# Patient Record
Sex: FEMALE | Race: WHITE | HISPANIC OR LATINO | Employment: UNEMPLOYED | URBAN - METROPOLITAN AREA
[De-identification: names, ages, dates, MRNs, and addresses within clinical notes are randomized per-mention and may not be internally consistent; named-entity substitution may affect disease eponyms.]

---

## 2023-01-17 ENCOUNTER — APPOINTMENT (OUTPATIENT)
Dept: NUCLEAR MEDICINE | Facility: HOSPITAL | Age: 58
End: 2023-01-17
Payer: MEDICAID

## 2023-01-17 ENCOUNTER — APPOINTMENT (OUTPATIENT)
Dept: GENERAL RADIOLOGY | Facility: HOSPITAL | Age: 58
End: 2023-01-17
Payer: MEDICAID

## 2023-01-17 ENCOUNTER — HOSPITAL ENCOUNTER (OUTPATIENT)
Facility: HOSPITAL | Age: 58
Discharge: HOME | End: 2023-01-17
Attending: EMERGENCY MEDICINE | Admitting: INTERNAL MEDICINE
Payer: MEDICAID

## 2023-01-17 ENCOUNTER — READMISSION MANAGEMENT (OUTPATIENT)
Dept: CALL CENTER | Facility: HOSPITAL | Age: 58
End: 2023-01-17
Payer: MEDICAID

## 2023-01-17 ENCOUNTER — APPOINTMENT (OUTPATIENT)
Dept: CARDIOLOGY | Facility: HOSPITAL | Age: 58
End: 2023-01-17
Payer: MEDICAID

## 2023-01-17 VITALS
DIASTOLIC BLOOD PRESSURE: 71 MMHG | HEIGHT: 59 IN | TEMPERATURE: 98 F | HEART RATE: 75 BPM | RESPIRATION RATE: 18 BRPM | SYSTOLIC BLOOD PRESSURE: 114 MMHG | OXYGEN SATURATION: 99 % | BODY MASS INDEX: 22.58 KG/M2 | WEIGHT: 112 LBS

## 2023-01-17 DIAGNOSIS — R07.9 CHEST PAIN, UNSPECIFIED TYPE: Primary | ICD-10-CM

## 2023-01-17 LAB
ALBUMIN SERPL-MCNC: 4.3 G/DL (ref 3.5–5.2)
ALBUMIN/GLOB SERPL: 1.9 G/DL
ALP SERPL-CCNC: 96 U/L (ref 39–117)
ALT SERPL W P-5'-P-CCNC: 11 U/L (ref 1–33)
ANION GAP SERPL CALCULATED.3IONS-SCNC: 8 MMOL/L (ref 5–15)
ANION GAP SERPL CALCULATED.3IONS-SCNC: 9 MMOL/L (ref 5–15)
AST SERPL-CCNC: 20 U/L (ref 1–32)
BASOPHILS # BLD AUTO: 0 10*3/MM3 (ref 0–0.2)
BASOPHILS # BLD AUTO: 0 10*3/MM3 (ref 0–0.2)
BASOPHILS NFR BLD AUTO: 0.4 % (ref 0–1.5)
BASOPHILS NFR BLD AUTO: 0.6 % (ref 0–1.5)
BH CV NUCLEAR PRIOR STUDY: 3
BH CV REST NUCLEAR ISOTOPE DOSE: 8 MCI
BH CV STRESS BP STAGE 1: NORMAL
BH CV STRESS BP STAGE 2: NORMAL
BH CV STRESS BP STAGE 3: NORMAL
BH CV STRESS BP STAGE 4: NORMAL
BH CV STRESS COMMENTS STAGE 1: NORMAL
BH CV STRESS DOSE REGADENOSON STAGE 1: 0.4
BH CV STRESS DURATION MIN STAGE 1: 1
BH CV STRESS DURATION MIN STAGE 2: 1
BH CV STRESS DURATION MIN STAGE 3: 1
BH CV STRESS DURATION MIN STAGE 4: 1
BH CV STRESS DURATION SEC STAGE 2: 0
BH CV STRESS HR STAGE 1: 120
BH CV STRESS HR STAGE 2: 117
BH CV STRESS HR STAGE 3: 113
BH CV STRESS HR STAGE 4: 111
BH CV STRESS NUCLEAR ISOTOPE DOSE: 31.4 MCI
BH CV STRESS PROTOCOL 1: NORMAL
BH CV STRESS RECOVERY BP: NORMAL MMHG
BH CV STRESS RECOVERY HR: 104 BPM
BH CV STRESS STAGE 1: 1
BH CV STRESS STAGE 2: 2
BH CV STRESS STAGE 3: 3
BH CV STRESS STAGE 4: 4
BILIRUB SERPL-MCNC: <0.2 MG/DL (ref 0–1.2)
BUN SERPL-MCNC: 19 MG/DL (ref 6–20)
BUN SERPL-MCNC: 20 MG/DL (ref 6–20)
BUN/CREAT SERPL: 44.2 (ref 7–25)
BUN/CREAT SERPL: 44.4 (ref 7–25)
CALCIUM SPEC-SCNC: 8.6 MG/DL (ref 8.6–10.5)
CALCIUM SPEC-SCNC: 9.3 MG/DL (ref 8.6–10.5)
CHLORIDE SERPL-SCNC: 104 MMOL/L (ref 98–107)
CHLORIDE SERPL-SCNC: 107 MMOL/L (ref 98–107)
CHOLEST SERPL-MCNC: 182 MG/DL (ref 0–200)
CO2 SERPL-SCNC: 27 MMOL/L (ref 22–29)
CO2 SERPL-SCNC: 27 MMOL/L (ref 22–29)
CREAT SERPL-MCNC: 0.43 MG/DL (ref 0.57–1)
CREAT SERPL-MCNC: 0.45 MG/DL (ref 0.57–1)
D DIMER PPP FEU-MCNC: <0.19 MG/L (FEU) (ref 0–0.58)
DEPRECATED RDW RBC AUTO: 42.4 FL (ref 37–54)
DEPRECATED RDW RBC AUTO: 43.3 FL (ref 37–54)
EGFRCR SERPLBLD CKD-EPI 2021: 111.7 ML/MIN/1.73
EGFRCR SERPLBLD CKD-EPI 2021: 112.9 ML/MIN/1.73
EOSINOPHIL # BLD AUTO: 0.1 10*3/MM3 (ref 0–0.4)
EOSINOPHIL # BLD AUTO: 0.2 10*3/MM3 (ref 0–0.4)
EOSINOPHIL NFR BLD AUTO: 1.4 % (ref 0.3–6.2)
EOSINOPHIL NFR BLD AUTO: 4.3 % (ref 0.3–6.2)
ERYTHROCYTE [DISTWIDTH] IN BLOOD BY AUTOMATED COUNT: 12.6 % (ref 12.3–15.4)
ERYTHROCYTE [DISTWIDTH] IN BLOOD BY AUTOMATED COUNT: 12.6 % (ref 12.3–15.4)
GLOBULIN UR ELPH-MCNC: 2.3 GM/DL
GLUCOSE SERPL-MCNC: 104 MG/DL (ref 65–99)
GLUCOSE SERPL-MCNC: 110 MG/DL (ref 65–99)
HCT VFR BLD AUTO: 35 % (ref 34–46.6)
HCT VFR BLD AUTO: 38.9 % (ref 34–46.6)
HDLC SERPL-MCNC: 67 MG/DL (ref 40–60)
HGB BLD-MCNC: 11.8 G/DL (ref 12–15.9)
HGB BLD-MCNC: 13.3 G/DL (ref 12–15.9)
LDLC SERPL CALC-MCNC: 100 MG/DL (ref 0–100)
LDLC/HDLC SERPL: 1.47 {RATIO}
LV EF NUC BP: 88 %
LYMPHOCYTES # BLD AUTO: 1.8 10*3/MM3 (ref 0.7–3.1)
LYMPHOCYTES # BLD AUTO: 2.5 10*3/MM3 (ref 0.7–3.1)
LYMPHOCYTES NFR BLD AUTO: 17.8 % (ref 19.6–45.3)
LYMPHOCYTES NFR BLD AUTO: 45.8 % (ref 19.6–45.3)
MAGNESIUM SERPL-MCNC: 2 MG/DL (ref 1.6–2.6)
MAXIMAL PREDICTED HEART RATE: 162 BPM
MCH RBC QN AUTO: 31.1 PG (ref 26.6–33)
MCH RBC QN AUTO: 31.6 PG (ref 26.6–33)
MCHC RBC AUTO-ENTMCNC: 33.6 G/DL (ref 31.5–35.7)
MCHC RBC AUTO-ENTMCNC: 34.4 G/DL (ref 31.5–35.7)
MCV RBC AUTO: 92.1 FL (ref 79–97)
MCV RBC AUTO: 92.6 FL (ref 79–97)
MONOCYTES # BLD AUTO: 0.5 10*3/MM3 (ref 0.1–0.9)
MONOCYTES # BLD AUTO: 0.5 10*3/MM3 (ref 0.1–0.9)
MONOCYTES NFR BLD AUTO: 5.3 % (ref 5–12)
MONOCYTES NFR BLD AUTO: 9.9 % (ref 5–12)
NEUTROPHILS NFR BLD AUTO: 2.2 10*3/MM3 (ref 1.7–7)
NEUTROPHILS NFR BLD AUTO: 39.4 % (ref 42.7–76)
NEUTROPHILS NFR BLD AUTO: 7.6 10*3/MM3 (ref 1.7–7)
NEUTROPHILS NFR BLD AUTO: 75.1 % (ref 42.7–76)
NRBC BLD AUTO-RTO: 0 /100 WBC (ref 0–0.2)
NRBC BLD AUTO-RTO: 0.1 /100 WBC (ref 0–0.2)
NT-PROBNP SERPL-MCNC: 206.4 PG/ML (ref 0–900)
PERCENT MAX PREDICTED HR: 74.69 %
PLATELET # BLD AUTO: 273 10*3/MM3 (ref 140–450)
PLATELET # BLD AUTO: 292 10*3/MM3 (ref 140–450)
PMV BLD AUTO: 7.5 FL (ref 6–12)
PMV BLD AUTO: 7.6 FL (ref 6–12)
POTASSIUM SERPL-SCNC: 3.9 MMOL/L (ref 3.5–5.2)
POTASSIUM SERPL-SCNC: 3.9 MMOL/L (ref 3.5–5.2)
PROT SERPL-MCNC: 6.6 G/DL (ref 6–8.5)
QT INTERVAL: 377 MS
RBC # BLD AUTO: 3.78 10*6/MM3 (ref 3.77–5.28)
RBC # BLD AUTO: 4.22 10*6/MM3 (ref 3.77–5.28)
SODIUM SERPL-SCNC: 140 MMOL/L (ref 136–145)
SODIUM SERPL-SCNC: 142 MMOL/L (ref 136–145)
STRESS BASELINE BP: NORMAL MMHG
STRESS BASELINE HR: 85 BPM
STRESS PERCENT HR: 88 %
STRESS POST PEAK BP: NORMAL MMHG
STRESS POST PEAK HR: 121 BPM
STRESS TARGET HR: 138 BPM
TRIGL SERPL-MCNC: 81 MG/DL (ref 0–150)
TROPONIN T SERPL-MCNC: <0.01 NG/ML (ref 0–0.03)
TSH SERPL DL<=0.05 MIU/L-ACNC: 1.3 UIU/ML (ref 0.27–4.2)
VLDLC SERPL-MCNC: 15 MG/DL (ref 5–40)
WBC NRBC COR # BLD: 10.2 10*3/MM3 (ref 3.4–10.8)
WBC NRBC COR # BLD: 5.5 10*3/MM3 (ref 3.4–10.8)

## 2023-01-17 PROCEDURE — 80061 LIPID PANEL: CPT | Performed by: NURSE PRACTITIONER

## 2023-01-17 PROCEDURE — 93005 ELECTROCARDIOGRAM TRACING: CPT | Performed by: EMERGENCY MEDICINE

## 2023-01-17 PROCEDURE — G0378 HOSPITAL OBSERVATION PER HR: HCPCS

## 2023-01-17 PROCEDURE — 93018 CV STRESS TEST I&R ONLY: CPT | Performed by: INTERNAL MEDICINE

## 2023-01-17 PROCEDURE — 78452 HT MUSCLE IMAGE SPECT MULT: CPT

## 2023-01-17 PROCEDURE — 84484 ASSAY OF TROPONIN QUANT: CPT | Performed by: NURSE PRACTITIONER

## 2023-01-17 PROCEDURE — 80050 GENERAL HEALTH PANEL: CPT | Performed by: NURSE PRACTITIONER

## 2023-01-17 PROCEDURE — 80048 BASIC METABOLIC PNL TOTAL CA: CPT | Performed by: NURSE PRACTITIONER

## 2023-01-17 PROCEDURE — 71045 X-RAY EXAM CHEST 1 VIEW: CPT

## 2023-01-17 PROCEDURE — 93306 TTE W/DOPPLER COMPLETE: CPT

## 2023-01-17 PROCEDURE — 93005 ELECTROCARDIOGRAM TRACING: CPT

## 2023-01-17 PROCEDURE — 83880 ASSAY OF NATRIURETIC PEPTIDE: CPT | Performed by: NURSE PRACTITIONER

## 2023-01-17 PROCEDURE — 25010000002 REGADENOSON 0.4 MG/5ML SOLUTION: Performed by: INTERNAL MEDICINE

## 2023-01-17 PROCEDURE — 96374 THER/PROPH/DIAG INJ IV PUSH: CPT

## 2023-01-17 PROCEDURE — 0 TECHNETIUM TETROFOSMIN KIT: Performed by: INTERNAL MEDICINE

## 2023-01-17 PROCEDURE — 93017 CV STRESS TEST TRACING ONLY: CPT

## 2023-01-17 PROCEDURE — 78452 HT MUSCLE IMAGE SPECT MULT: CPT | Performed by: INTERNAL MEDICINE

## 2023-01-17 PROCEDURE — 83735 ASSAY OF MAGNESIUM: CPT | Performed by: NURSE PRACTITIONER

## 2023-01-17 PROCEDURE — A9502 TC99M TETROFOSMIN: HCPCS | Performed by: INTERNAL MEDICINE

## 2023-01-17 PROCEDURE — 93005 ELECTROCARDIOGRAM TRACING: CPT | Performed by: NURSE PRACTITIONER

## 2023-01-17 PROCEDURE — 99285 EMERGENCY DEPT VISIT HI MDM: CPT

## 2023-01-17 PROCEDURE — 85025 COMPLETE CBC W/AUTO DIFF WBC: CPT | Performed by: NURSE PRACTITIONER

## 2023-01-17 PROCEDURE — 36415 COLL VENOUS BLD VENIPUNCTURE: CPT

## 2023-01-17 PROCEDURE — 85379 FIBRIN DEGRADATION QUANT: CPT | Performed by: NURSE PRACTITIONER

## 2023-01-17 PROCEDURE — 99232 SBSQ HOSP IP/OBS MODERATE 35: CPT | Performed by: INTERNAL MEDICINE

## 2023-01-17 RX ORDER — ONDANSETRON 4 MG/1
4 TABLET, FILM COATED ORAL EVERY 6 HOURS PRN
Status: DISCONTINUED | OUTPATIENT
Start: 2023-01-17 | End: 2023-01-17 | Stop reason: HOSPADM

## 2023-01-17 RX ORDER — SODIUM CHLORIDE 0.9 % (FLUSH) 0.9 %
10 SYRINGE (ML) INJECTION EVERY 12 HOURS SCHEDULED
Status: DISCONTINUED | OUTPATIENT
Start: 2023-01-17 | End: 2023-01-17 | Stop reason: HOSPADM

## 2023-01-17 RX ORDER — SODIUM CHLORIDE 0.9 % (FLUSH) 0.9 %
10 SYRINGE (ML) INJECTION AS NEEDED
Status: DISCONTINUED | OUTPATIENT
Start: 2023-01-17 | End: 2023-01-17 | Stop reason: HOSPADM

## 2023-01-17 RX ORDER — NITROGLYCERIN 0.4 MG/1
0.4 TABLET SUBLINGUAL
Status: COMPLETED | OUTPATIENT
Start: 2023-01-17 | End: 2023-01-17

## 2023-01-17 RX ORDER — ISOSORBIDE MONONITRATE 30 MG/1
30 TABLET, EXTENDED RELEASE ORAL
Status: DISCONTINUED | OUTPATIENT
Start: 2023-01-17 | End: 2023-01-17 | Stop reason: HOSPADM

## 2023-01-17 RX ORDER — ONDANSETRON 2 MG/ML
4 INJECTION INTRAMUSCULAR; INTRAVENOUS EVERY 6 HOURS PRN
Status: DISCONTINUED | OUTPATIENT
Start: 2023-01-17 | End: 2023-01-17 | Stop reason: HOSPADM

## 2023-01-17 RX ORDER — ALUMINA, MAGNESIA, AND SIMETHICONE 2400; 2400; 240 MG/30ML; MG/30ML; MG/30ML
15 SUSPENSION ORAL EVERY 6 HOURS PRN
Status: DISCONTINUED | OUTPATIENT
Start: 2023-01-17 | End: 2023-01-17 | Stop reason: HOSPADM

## 2023-01-17 RX ORDER — ASPIRIN 81 MG/1
324 TABLET, CHEWABLE ORAL ONCE
Status: COMPLETED | OUTPATIENT
Start: 2023-01-17 | End: 2023-01-17

## 2023-01-17 RX ORDER — ACETAMINOPHEN 160 MG/5ML
650 SOLUTION ORAL EVERY 4 HOURS PRN
Status: DISCONTINUED | OUTPATIENT
Start: 2023-01-17 | End: 2023-01-17 | Stop reason: HOSPADM

## 2023-01-17 RX ORDER — METOPROLOL SUCCINATE 25 MG/1
25 TABLET, EXTENDED RELEASE ORAL
Status: DISCONTINUED | OUTPATIENT
Start: 2023-01-17 | End: 2023-01-17 | Stop reason: HOSPADM

## 2023-01-17 RX ORDER — ALBUTEROL SULFATE 90 UG/1
2 AEROSOL, METERED RESPIRATORY (INHALATION) EVERY 4 HOURS PRN
COMMUNITY

## 2023-01-17 RX ORDER — ACETAMINOPHEN 325 MG/1
650 TABLET ORAL EVERY 4 HOURS PRN
Status: DISCONTINUED | OUTPATIENT
Start: 2023-01-17 | End: 2023-01-17 | Stop reason: HOSPADM

## 2023-01-17 RX ORDER — ASPIRIN 81 MG/1
81 TABLET, CHEWABLE ORAL DAILY
Status: DISCONTINUED | OUTPATIENT
Start: 2023-01-18 | End: 2023-01-17 | Stop reason: HOSPADM

## 2023-01-17 RX ORDER — ACETAMINOPHEN 650 MG/1
650 SUPPOSITORY RECTAL EVERY 4 HOURS PRN
Status: DISCONTINUED | OUTPATIENT
Start: 2023-01-17 | End: 2023-01-17 | Stop reason: HOSPADM

## 2023-01-17 RX ORDER — METOPROLOL SUCCINATE 25 MG/1
25 TABLET, EXTENDED RELEASE ORAL
Qty: 30 TABLET | Refills: 0 | Status: SHIPPED | OUTPATIENT
Start: 2023-01-17 | End: 2023-02-09 | Stop reason: SDUPTHER

## 2023-01-17 RX ORDER — ASPIRIN 81 MG/1
81 TABLET, CHEWABLE ORAL DAILY
Qty: 30 TABLET | Refills: 0 | Status: SHIPPED | OUTPATIENT
Start: 2023-01-18 | End: 2023-02-09 | Stop reason: SDUPTHER

## 2023-01-17 RX ORDER — SODIUM CHLORIDE 9 MG/ML
40 INJECTION, SOLUTION INTRAVENOUS AS NEEDED
Status: DISCONTINUED | OUTPATIENT
Start: 2023-01-17 | End: 2023-01-17 | Stop reason: HOSPADM

## 2023-01-17 RX ORDER — PANTOPRAZOLE SODIUM 40 MG/10ML
40 INJECTION, POWDER, LYOPHILIZED, FOR SOLUTION INTRAVENOUS
Status: DISCONTINUED | OUTPATIENT
Start: 2023-01-17 | End: 2023-01-17 | Stop reason: HOSPADM

## 2023-01-17 RX ORDER — CHOLECALCIFEROL (VITAMIN D3) 125 MCG
5 CAPSULE ORAL NIGHTLY PRN
Status: DISCONTINUED | OUTPATIENT
Start: 2023-01-17 | End: 2023-01-17 | Stop reason: HOSPADM

## 2023-01-17 RX ORDER — PANTOPRAZOLE SODIUM 40 MG/1
40 TABLET, DELAYED RELEASE ORAL DAILY
Qty: 30 TABLET | Refills: 0 | Status: SHIPPED | OUTPATIENT
Start: 2023-01-17 | End: 2023-02-09 | Stop reason: SDUPTHER

## 2023-01-17 RX ADMIN — NITROGLYCERIN 1 INCH: 20 OINTMENT TOPICAL at 02:49

## 2023-01-17 RX ADMIN — Medication 10 ML: at 11:22

## 2023-01-17 RX ADMIN — METOPROLOL SUCCINATE 25 MG: 25 TABLET, EXTENDED RELEASE ORAL at 16:42

## 2023-01-17 RX ADMIN — ACETAMINOPHEN 650 MG: 325 TABLET, FILM COATED ORAL at 16:42

## 2023-01-17 RX ADMIN — TETROFOSMIN 1 DOSE: 1.38 INJECTION, POWDER, LYOPHILIZED, FOR SOLUTION INTRAVENOUS at 14:09

## 2023-01-17 RX ADMIN — REGADENOSON 0.4 MG: 0.08 INJECTION, SOLUTION INTRAVENOUS at 14:09

## 2023-01-17 RX ADMIN — PANTOPRAZOLE SODIUM 40 MG: 40 INJECTION, POWDER, FOR SOLUTION INTRAVENOUS at 06:31

## 2023-01-17 RX ADMIN — ASPIRIN 81 MG CHEWABLE TABLET 324 MG: 81 TABLET CHEWABLE at 01:46

## 2023-01-17 RX ADMIN — NITROGLYCERIN 0.4 MG: 0.4 TABLET SUBLINGUAL at 01:48

## 2023-01-17 RX ADMIN — NITROGLYCERIN 0.4 MG: 0.4 TABLET SUBLINGUAL at 01:54

## 2023-01-17 RX ADMIN — TETROFOSMIN 1 DOSE: 1.38 INJECTION, POWDER, LYOPHILIZED, FOR SOLUTION INTRAVENOUS at 13:07

## 2023-01-17 RX ADMIN — NITROGLYCERIN 0.4 MG: 0.4 TABLET SUBLINGUAL at 02:00

## 2023-01-17 RX ADMIN — ISOSORBIDE MONONITRATE 30 MG: 30 TABLET, EXTENDED RELEASE ORAL at 16:42

## 2023-01-17 NOTE — PLAN OF CARE
Goal Outcome Evaluation:  Patient arrived to to the floor from the ED. No complaints of chest pain. Interpretor utilized. Stress Test/Echo scheduled. Will continue to monitor

## 2023-01-17 NOTE — H&P
"    [facility] Medicine Services  History & Physical    Patient Name: Bree Mcmanus  : 1965  MRN: [id number]  Primary Care Physician:  Provider, No Known  Date of admission: 2023  Date and Time of Service: 2023 at 0300    Subjective      Chief Complaint: chest pain, shortness of breath    HPI collected via hospital provided video translation   History of Present Illness: Bree Mcmanus is a 57 y.o. female Thai-speaking only with only past medical history of asthma who entered the [place] from [place] three months ago.  She presented to [facility] ED 2023 after she awoke from sleep in the middle of the night tonight feeling like she was going to vomit. She then started having left sided chest pressure and felt short of breath.  She rated her pain 8 out of 10. She had associated palpitations.  She denied any dizziness or diaphoresis.  She denied any previous cardiac history. Her mother had a \"heart problem\" but this was not clarified further. She is not on any home medications. She denied any history of smoking, alcohol use, or drug use.      In the ED patient had EKG that showed sinus rhythm rate of 94 with borderline ST depression in anterolateral leads, no previous to compare.  First troponin was normal.  CXR showed patchy interstitial airspace disease in the right lower lobe could represent infectious bronchiolitis or viral pneumonia.  Vital signs within normal limits.  Patient was given 324 mg of chewable aspirin as well as nitro sublingual x3 tablets and Nitropaste.  Her pain improved to 1 out of 10.  She was admitted for further work-up of chest pain with abnormal EKG rule out ACS.     Review of Systems   Constitutional: Negative.   HENT: Negative.    Eyes: Negative.    Cardiovascular: Positive for chest pain and palpitations.   Respiratory: Positive for shortness of breath.    Endocrine: Negative.    Hematologic/Lymphatic: Negative.    Skin: Negative.  " "  Musculoskeletal: Negative.    Gastrointestinal: Positive for nausea.   Genitourinary: Negative.    Neurological: Negative.    Psychiatric/Behavioral: Negative.    Allergic/Immunologic: Negative.    All other systems reviewed and are negative.       Personal History     History reviewed. No pertinent past medical history.    No past surgical history on file.    Family History: mother with a \"heart problem\"    Social History:  Lives with family.  Recently relocated to [place] from [place].    Home Medications:  Prior to Admission Medications     None            Allergies:  No Known Allergies    Objective      Vitals:   Temp:  [98.5 °F (36.9 °C)] 98.5 °F (36.9 °C)  Heart Rate:  [] 89  Resp:  [18] 18  BP: (104-138)/(67-87) 104/68    Physical Exam  Vitals and nursing note reviewed.   HENT:      Head: Normocephalic and atraumatic.   Eyes:      Extraocular Movements: Extraocular movements intact.      Pupils: Pupils are equal, round, and reactive to light.   Cardiovascular:      Rate and Rhythm: Normal rate and regular rhythm.      Pulses: Normal pulses.      Heart sounds: Normal heart sounds.   Pulmonary:      Effort: Pulmonary effort is normal.      Breath sounds: Normal breath sounds.   Abdominal:      General: Bowel sounds are normal.      Palpations: Abdomen is soft.      Tenderness: There is no abdominal tenderness.   Musculoskeletal:         General: Normal range of motion.   Skin:     General: Skin is warm and dry.   Neurological:      Mental Status: She is alert and oriented to person, place, and time.   Psychiatric:         Mood and Affect: Mood normal.         Behavior: Behavior normal.         Result Review    Result Review:  I have personally reviewed the results from the time of this admission to 1/17/2023 03:41 EST and agree with these findings:  [x]  Laboratory  []  Microbiology  [x]  Radiology  [x]  EKG/Telemetry   []  Cardiology/Vascular   []  Pathology  []  Old records      Assessment & Plan  "       Active Hospital Problems:  Active Hospital Problems    Diagnosis    • **Chest pain      Plan:     Chest pain  -EKG: sinus rhythm rate of 94 with borderline ST depression in anterolateral leads, no previous to compare.  -CXR: patchy interstitial airspace disease in the right lower lobe could represent infectious bronchiolitis or viral pneumonia.    -WBC normal  -first troponin normal, proBNP normal  -pain improved to 1/10 after aspirin 324mg x1, nitroglycerin sublingual tablets x3 followed by nitro paste  -repeat troponin and EKG in 2 hours rule out ACS  -check 2D echo, TSH, lipid panel  -continue cardiac monitoring   -consult cardiology for further recommendations      DVT prophylaxis: SCDs    CODE STATUS:       Admission Status:  I believe this patient meets observation status.    I discussed the patient's findings and my recommendations with patient, family and nursing staff.    This patient has been examined wearing appropriate Personal Protective Equipment. 01/17/23      Signature: Electronically signed by ESTRELLA Kuhn, 01/17/23, 03:41 EST.

## 2023-01-17 NOTE — Clinical Note
Level of Care: Telemetry [5]   Admitting Physician: ARETHA HAYES [[id number]]   Attending Physician: ARETHA HAYES [[id number]]

## 2023-01-17 NOTE — CONSULTS
Cardiology [place]        Subjective:     Encounter Date:01/17/2023      Patient ID: Bree Mcmanus is a 57 y.o. female.    Chief Complaint: chest pain    Referring Physician: Dominique Garcia    HPI:  used. Patient Faroese speaking only  Bree Mcmanus is a 57 y.o. female who presents with chest pain. Ms. Mcmanus does not routinely see a cardiologist. She recently moved to [place] from [place] 3 months ago.  No past medical history. She presented to ER last night with chest pain. According to patient she woke up from her sleep choking and had burning sensation in her upper chest. She thought it was reflux but got scared and came to ER. Upon further questioning, she reports chest pressure when she climbs stairs or lifts items. She carries no diagnosis of hypertension, HLD, DM, smoking or family history of CAD.      Work up in ER reveal negative troponin, proBNP 206, BMP unremarkable, Ddimer negative,   WBC 10.2, CXR shows patchy interstitial airspace disease in the right lower lobe could represent infectious bronchiolitis or viral pneumonia.      ECG sinus rhythm with initial ECG showing borderline anterolateral ST depression but repeat ECG showed resolution of changes.  She was pain free on my encounter.    History reviewed. No pertinent past medical history.    History reviewed. No pertinent surgical history.    History reviewed. No pertinent family history.    Social History     Socioeconomic History   • Marital status: Single   Tobacco Use   • Smoking status: Never     Passive exposure: Never   • Smokeless tobacco: Never   Substance and Sexual Activity   • Alcohol use: Never   • Drug use: Never         No Known Allergies    Current Medications:   Scheduled Meds:[START ON 1/18/2023] aspirin, 81 mg, Oral, Daily  isosorbide mononitrate, 30 mg, Oral, Q24H  metoprolol succinate XL, 25 mg, Oral, Q24H  pantoprazole, 40 mg, Intravenous, Q AM  sodium chloride, 10 mL, Intravenous,  "Q12H      Continuous Infusions:     Review of Systems   Constitutional: Negative for chills, diaphoresis and malaise/fatigue.   Cardiovascular: Positive for chest pain. Negative for dyspnea on exertion, irregular heartbeat, leg swelling, near-syncope, orthopnea, palpitations, paroxysmal nocturnal dyspnea and syncope.   Respiratory: Negative for cough, shortness of breath, sleep disturbances due to breathing and sputum production.    Gastrointestinal: Negative for change in bowel habit.   Genitourinary: Negative for urgency.   Neurological: Negative for dizziness and headaches.   Psychiatric/Behavioral: Negative for altered mental status.          Objective:         /79   Pulse 86   Temp 97.8 °F (36.6 °C) (Oral)   Resp 16   Ht 149.9 cm (59\")   Wt 50.8 kg (112 lb)   SpO2 98%   BMI 22.62 kg/m²     Physical Exam:  General Appearance:    Alert, cooperative, in no acute distress                                Head: Atraumatic, normocephalic, PERRLA               Neck:   supple, trachea midline, no thyromegaly, no carotid bruit, no JVD   Lungs:     Clear to auscultation,respirations regular, even and               unlabored    Heart:    Regular rhythm and normal rate, normal S1 and S2   Abdomen:     Normal bowel sounds, no masses, no organomegaly, soft  nontender, nondistended, no guarding, no rebound  tenderness   Extremities:   Moves all extremities well, no edema, no cyanosis, no  redness   Pulses:   Pulses palpable and equal bilaterally   Skin:   No bleeding, bruising or rash   Neurologic:   Awake, alert, oriented x3                 ASCVD Risk Score::  The 10-year ASCVD risk score (Catherine DK, et al., 2019) is: 2%    Values used to calculate the score:      Age: 57 years      Sex: Female      Is Non- : No      Diabetic: No      Tobacco smoker: No      Systolic Blood Pressure: 123 mmHg      Is BP treated: No      HDL Cholesterol: 67 mg/dL      Total Cholesterol: 182 mg/dL      Lab " Review:     Results from last 7 days   Lab Units 01/17/23  0426 01/17/23  0157   SODIUM mmol/L 142 140   POTASSIUM mmol/L 3.9 3.9   CHLORIDE mmol/L 107 104   CO2 mmol/L 27.0 27.0   BUN mg/dL 19 20   CREATININE mg/dL 0.43* 0.45*   GLUCOSE mg/dL 104* 110*   CALCIUM mg/dL 8.6 9.3   AST (SGOT) U/L  --  20   ALT (SGPT) U/L  --  11     Results from last 7 days   Lab Units 01/17/23  1122 01/17/23  0426 01/17/23 0157   TROPONIN T ng/mL <0.010 <0.010 <0.010     Results from last 7 days   Lab Units 01/17/23  0426 01/17/23 0157   WBC 10*3/mm3 10.20 5.50   HEMOGLOBIN g/dL 11.8* 13.3   HEMATOCRIT % 35.0 38.9   PLATELETS 10*3/mm3 273 292         Results from last 7 days   Lab Units 01/17/23  0426   MAGNESIUM mg/dL 2.0     Results from last 7 days   Lab Units 01/17/23  0426   CHOLESTEROL mg/dL 182   TRIGLYCERIDES mg/dL 81   HDL CHOL mg/dL 67*     Results from last 7 days   Lab Units 01/17/23  0157   PROBNP pg/mL 206.4     Results from last 7 days   Lab Units 01/17/23 0426   TSH uIU/mL 1.300       Recent Radiology:  Imaging Results (Most Recent)     Procedure Component Value Units Date/Time    XR Chest 1 View [[id number]] Collected: 01/17/23 0016     Updated: 01/17/23 0217    Narrative:      Exam:  Single view chest    Date: January 17, 2023    History: Chest pain    Comparison: None available    Technique: Single frontal radiograph of the chest was obtained    Findings:    The heart, mediastinum and pulmonary vasculature are normal. There is patchy interstitial airspace disease in the right lower lobe. There is no pneumothorax or sizable pleural fluid collection.      Impression:      1. Patchy interstitial airspace disease in the right lower lobe could represent infectious bronchiolitis or viral pneumonia.        Slot 63      Electronically signed by:  Gary Cao M.D.    1/17/2023 12:16 AM Mountain Time            ECHOCARDIOGRAM:                          Assessment:         Active Hospital Problems    Diagnosis  POA   •  **Chest pain [R07.9]  Yes   • Chest pain, unspecified type [R07.9]  Yes     1. Chest Pain  - CE negative    2. Abnormal CXR with patchy interstitial airspace disease in right lower lobe  - afebrile, WBC normal       Plan:   Finnish speaking female with no past medical history presented with chest pain episode. Could represent GERD but she also describes exertional chest pressure and had some initial ECG changes. We will plan to   Check 2D ECHO  Stress test ordered  Additional recommendations pending above testing    Patient is seen and examined and findings are verified.  All data is reviewed by me personally.  Assessment and plan formulated by APC was done after discussion with attending.  I spent more than 50% of time in taking care of the patient.    Patient is presented with chest pain and nausea.  Patient denies any shortness of breath.  There is a language barrier.  I was able to communicate to  sophie.  Patient has been complaining of chest tightness.    Hemodynamics are stable    Normal S1 and S2.  No pericardial rub or murmur abdominal exam shows epigastric tenderness.  No leg edema noted.    It is difficult to evaluate the patient because of language barrier.  However we were able to communicate through the .  Patient complains of chest burning and tightness.  There is no correlation with exertion.  EKG is negative.  Troponins are flat and normal.  Patient underwent stress test which showed GI uptake but there is no ischemia or myocardial infarction.  Echocardiogram showed normal left ventricular size and function.  Mild LVH is noted.  Ejection fraction was 60 to 65%.    At this stage I would recommend to treat the patient has gastroesophageal reflux disease.  I would start Protonix.  I would also give aspirin and Toprol-XL.  Imdur will be started.  I will see the patient in 1 month.  If patient continues to have chest pain patient would need cardiac catheterization.    Electronically  signed by Liang Quiñones MD, 01/17/23, 3:28 PM EST.                 Liang Quiñones MD  01/17/23  15:28 EST

## 2023-01-17 NOTE — DISCHARGE SUMMARY
"             [facility]  DISCHARGE SUMMARY    Patient Name: Bree Mcmanus  : 1965  MRN: [id number]    Date of Admission: 2023  Discharge Diagnosis: chest pain  Date of Discharge:  23  Primary Care Physician: Provider, No Known      Presenting Problem:   Chest pain, unspecified type [R07.9]    Active and Resolved Hospital Problems:  Active Hospital Problems    Diagnosis POA   • **Chest pain [R07.9] Yes   • Chest pain, unspecified type [R07.9] Yes      Resolved Hospital Problems   No resolved problems to display.     Chest pain  -EKG: sinus rhythm rate of 94 with borderline ST depression in anterolateral leads, no previous to compare.  -CXR: patchy interstitial airspace disease in the right lower lobe could represent infectious bronchiolitis or viral pneumonia.    -WBC normal  - troponin normal, proBNP normal  -pain improved to 1/10 after aspirin 324mg x1, nitroglycerin sublingual tablets x3 followed by nitro paste  -repeat troponin and EKG in 2 hours rule out ACS  -check 2D echo,   -TSH, lipid panel  -continue cardiac monitoring   -consult cardiology for further recommendations    Hospital Course     Hospital Course:  History of Present Illness: Bree Mcmanus is a 57 y.o. female Gibraltarian-speaking only with only past medical history of asthma who entered the [place] from [place] three months ago.  She presented to [facility] ED 2023 after she awoke from sleep in the middle of the night tonight feeling like she was going to vomit. She then started having left sided chest pressure and felt short of breath.  She rated her pain 8 out of 10. She had associated palpitations.  She denied any dizziness or diaphoresis.  She denied any previous cardiac history. Her mother had a \"heart problem\" but this was not clarified further. She is not on any home medications. She denied any history of smoking, alcohol use, or drug use.       In the ED patient " had EKG that showed sinus rhythm rate of 94 with borderline ST depression in anterolateral leads, no previous to compare.  First troponin was normal.  CXR showed patchy interstitial airspace disease in the right lower lobe could represent infectious bronchiolitis or viral pneumonia.  Vital signs within normal limits.  Patient was given 324 mg of chewable aspirin as well as nitro sublingual x3 tablets and Nitropaste.  Her pain improved to 1 out of 10.  She was admitted for further work-up of chest pain with abnormal EKG rule out ACS.       1/17/23 patient seen and examined in bed NAD. Will dc home, condition on dc stable    DISCHARGE Follow Up Recommendations for labs and diagnostics: follow with pcp in one week      Reasons For Change In Medications and Indications for New Medications:ASA, toprol. protonix      Day of Discharge     Vital Signs:  Temp:  [97.8 °F (36.6 °C)-98.5 °F (36.9 °C)] 98 °F (36.7 °C)  Heart Rate:  [] 75  Resp:  [16-18] 18  BP: ()/(59-87) 114/71    Physical Exam HENT:      Head: Normocephalic and atraumatic.   Eyes:      Extraocular Movements: Extraocular movements intact.      Pupils: Pupils are equal, round, and reactive to light.   Cardiovascular:      Rate and Rhythm: Normal rate and regular rhythm.      Pulses: Normal pulses.      Heart sounds: Normal heart sounds.   Pulmonary:      Effort: Pulmonary effort is normal.      Breath sounds: Normal breath sounds.   Abdominal:      General: Bowel sounds are normal.      Palpations: Abdomen is soft.      Tenderness: There is no abdominal tenderness.   Musculoskeletal:         General: Normal range of motion.   Skin:     General: Skin is warm and dry.   Neurological:      Mental Status: She is alert and oriented to person, place, and time.   Psychiatric:         Mood and Affect: Mood normal.         Behavior: Behavior normal.       Pertinent  and/or Most Recent Results     LAB RESULTS:      Lab 01/17/23  0426 01/17/23  0157   WBC 10.20  5.50   HEMOGLOBIN 11.8* 13.3   HEMATOCRIT 35.0 38.9   PLATELETS 273 292   NEUTROS ABS 7.60* 2.20   LYMPHS ABS 1.80 2.50   MONOS ABS 0.50 0.50   EOS ABS 0.10 0.20   MCV 92.6 92.1         Lab 01/17/23  0426 01/17/23 0157   SODIUM 142 140   POTASSIUM 3.9 3.9   CHLORIDE 107 104   CO2 27.0 27.0   ANION GAP 8.0 9.0   BUN 19 20   CREATININE 0.43* 0.45*   EGFR 112.9 111.7   GLUCOSE 104* 110*   CALCIUM 8.6 9.3   MAGNESIUM 2.0  --    TSH 1.300  --          Lab 01/17/23 0157   TOTAL PROTEIN 6.6   ALBUMIN 4.3   GLOBULIN 2.3   ALT (SGPT) 11   AST (SGOT) 20   BILIRUBIN <0.2   ALK PHOS 96         Lab 01/17/23  1122 01/17/23  0426 01/17/23 0157   PROBNP  --   --  206.4   TROPONIN T <0.010 <0.010 <0.010         Lab 01/17/23 0426   CHOLESTEROL 182   LDL CHOL 100   HDL CHOL 67*   TRIGLYCERIDES 81             Brief Urine Lab Results     None        Microbiology Results (last 10 days)     ** No results found for the last 240 hours. **          XR Chest 1 View    Result Date: 1/17/2023  Impression: 1. Patchy interstitial airspace disease in the right lower lobe could represent infectious bronchiolitis or viral pneumonia. Sl[address] 63 Electronically signed by:  Gary Cao M.D.  1/17/2023 12:16 AM Mountain Time                  Labs Pending at Discharge:      Procedures Performed           Consults:   Consults     Date and Time Order Name Status Description    1/17/2023  3:27 AM Inpatient Cardiology Consult Completed     1/17/2023  2:42 AM Hospitalist (on-call MD unless specified)        Tajik speaking female with no past medical history presented with chest pain episode. Could represent GERD but she also describes exertional chest pressure and had some initial ECG changes. We will plan to   Check 2D ECHO  Stress test ordered  Additional recommendations pending above testing     Patient is seen and examined and findings are verified.  All data is reviewed by me personally.  Assessment and plan formulated by APC was done after  discussion with attending.  I spent more than 50% of time in taking care of the patient.     Patient is presented with chest pain and nausea.  Patient denies any shortness of breath.  There is a language barrier.  I was able to communicate to  sophie.  Patient has been complaining of chest tightness.     Hemodynamics are stable     Normal S1 and S2.  No pericardial rub or murmur abdominal exam shows epigastric tenderness.  No leg edema noted.     It is difficult to evaluate the patient because of language barrier.  However we were able to communicate through the .  Patient complains of chest burning and tightness.  There is no correlation with exertion.  EKG is negative.  Troponins are flat and normal.  Patient underwent stress test which showed GI uptake but there is no ischemia or myocardial infarction.  Echocardiogram showed normal left ventricular size and function.  Mild LVH is noted.  Ejection fraction was 60 to 65%.     At this stage I would recommend to treat the patient has gastroesophageal reflux disease.  I would start Protonix.  I would also give aspirin and Toprol-XL.  Imdur will be started.  I will see the patient in 1 month.  If patient continues to have chest pain patient would need cardiac catheterization.     Electronically signed by Liang Quiñones MD, 01/17/23, 3:28 PM EST.            Discharge Details        Discharge Medications      New Medications      Instructions Start Date   aspirin 81 MG chewable tablet   81 mg, Oral, Daily   Start Date: January 18, 2023     metoprolol succinate XL 25 MG 24 hr tablet  Commonly known as: TOPROL-XL   25 mg, Oral, Every 24 Hours Scheduled      pantoprazole 40 MG EC tablet  Commonly known as: Protonix   40 mg, Oral, Daily         Continue These Medications      Instructions Start Date   albuterol sulfate  (90 Base) MCG/ACT inhaler  Commonly known as: PROVENTIL HFA;VENTOLIN HFA;PROAIR HFA   2 puffs, Inhalation, Every 4 Hours PRN              No Known Allergies      Discharge Disposition:   Home or Self Care    Diet:  Hospital:  Diet Order   Procedures   • NPO Diet NPO Type: Strict NPO         Discharge Activity:   Activity Instructions     Gradually Increase Activity Until at Pre-Hospitalization Level              CODE STATUS:  Code Status and Medical Interventions:   Ordered at: 01/17/23 0346     Level Of Support Discussed With:    Patient     Code Status (Patient has no pulse and is not breathing):    CPR (Attempt to Resuscitate)     Medical Interventions (Patient has pulse or is breathing):    Full Support         Future Appointments   Date Time Provider Department Center   2/9/2023  2:00 PM Valerie Luis APRN [facility]       Additional Instructions for the Follow-ups that You Need to Schedule     Discharge Follow-up with PCP   As directed       Currently Documented PCP:    Provider, No Known    PCP Phone Number:    None     Follow Up Details: 1 week               Time spent on Discharge including face to face service:  34 minutes    This patient has been examined wearing appropriate Personal Protective Equipment and discussed with rn. 01/17/23      Signature: Electronically signed by J Carlos García MD, 01/17/23, 4:33 PM EST.

## 2023-01-17 NOTE — CASE MANAGEMENT/SOCIAL WORK
Discharge Planning Assessment  [facility]     Patient Name: Bree Mcmanus  MRN: [id number]  Today's Date: 1/17/2023    Admit Date: 1/17/2023    Plan: D/C plan: Anticipate home with family   Discharge Needs Assessment     Row Name 01/17/23 1406       Living Environment    People in Home child(prasanth), adult;grandchild(prasanth)    Name(s) of People in Home Daughter, JARED, 2 grandchildren    Current Living Arrangements home    Primary Care Provided by self    Provides Primary Care For no one    Family Caregiver if Needed child(prasanth), adult    Quality of Family Relationships helpful;involved;supportive    Able to Return to Prior Arrangements yes       Resource/Environmental Concerns    Resource/Environmental Concerns none    Transportation Concerns none       Transition Planning    Patient/Family Anticipates Transition to home with family    Patient/Family Anticipated Services at Transition none    Transportation Anticipated family or friend will provide       Discharge Needs Assessment    Readmission Within the Last 30 Days no previous admission in last 30 days    Equipment Currently Used at Home none    Concerns to be Addressed discharge planning    Anticipated Changes Related to Illness none    Equipment Needed After Discharge none    Provided Post Acute Provider List? N/A    N/A Provider List Comment Anticipate home               Discharge Plan     Row Name 01/17/23 1400       Plan    Plan D/C plan: Anticipate home with family    Patient/Family in Agreement with Plan yes    Plan Comments Met with pt at bedside. Pt is [place] citizen speaking. CM used ipad at bedside for  services.  #[id number] Marek. Pt lives at home with daughter, son in law, and their two children. Pt is IADL, except driving. JARED will transport at time of d/c. Pt does not have PCP, is agreeable to CM getting new PCP appt scheduled, wants O[place]i speaking if possible. Appt details will be listed on AVS. Pharmacy confirmed. No financial  barriers to meds. No use of HHC. Barrier to d/c: cardiology consulted, stress test today                    Demographic Summary     Row Name 01/17/23 1409       General Information    Admission Type observation    Arrived From emergency department    Referral Source admission list    Reason for Consult discharge planning    Preferred Language Georgian               Functional Status     Row Name 01/17/23 1405       Functional Status    Usual Activity Tolerance good    Current Activity Tolerance good       Functional Status, IADL    Medications independent    Meal Preparation independent    Housekeeping independent    Laundry independent    Shopping independent                     Met with patient in room wearing PPE: mask  Maintained distance greater than six feet and spent less than 15 minutes in the room.          Torsten Rice RN

## 2023-01-17 NOTE — CASE MANAGEMENT/SOCIAL WORK
Continued Stay Note  [facility]     Patient Name: Bree Mcmanus  MRN: [id number]  Today's Date: 1/17/2023    Admit Date: 1/17/2023    Plan: D/C plan: Anticipate home with family   Discharge Plan     Row Name 01/17/23 1456       Plan    Plan Comments CM contacted [facility] to schedule new PCP appt. CM was advised there are no Vietnamese speaking providers at all in this market. Appt was made at the soonest location that was available in the area. Scheduled for 2/9 in [place].                          Phone communication or documentation only - no physical contact with patient or family.          Torsten Rice RN

## 2023-01-17 NOTE — ED PROVIDER NOTES
Subjective   History of Present Illness  Patient presents with:  Chest Pain    No primary care provider on file.   No LMP recorded. Patient is postmenopausal.   #[id number] utilized  Patient is a 57-year-old female presents the ED with complaint of chest pain, shortness of breath, nausea.  Patient reports this began 5 to 6 days ago, she reports tonight her symptoms woke her from sleep, she reports that she felt as though she was going to vomit, and almost choked.  She denies any dizziness, lightheadedness, diaphoresis or syncope.  No abnormal leg pain or swelling.  Patient reports has a history of asthma, and only uses an albuterol inhaler at home, no other home medications.  Pain is a pressure and discomfort in the chest, nonradiating.  Currently 8 out of 10.    She reports her sister  of a heart problem, but denies any personal history of heart disease.  He does not smoke.        Review of Systems   Constitutional: Negative for chills and fever.   Respiratory: Positive for shortness of breath. Negative for cough and chest tightness.    Cardiovascular: Positive for chest pain. Negative for palpitations and leg swelling.   Gastrointestinal: Positive for nausea and vomiting. Negative for abdominal pain.   Musculoskeletal: Negative for back pain and neck pain.   Skin: Negative for color change and rash.   Neurological: Negative for dizziness, syncope, weakness and light-headedness.       History reviewed. No pertinent past medical history.    No Known Allergies    No past surgical history on file.    History reviewed. No pertinent family history.    Social History     Socioeconomic History   • Marital status: Single           Objective   Physical Exam  Vitals and nursing note reviewed.   Constitutional:       Appearance: She is well-developed.   HENT:      Head: Normocephalic and atraumatic.   Eyes:      Extraocular Movements: Extraocular movements intact.      Pupils: Pupils are equal, round, and reactive  "to light.   Cardiovascular:      Rate and Rhythm: Normal rate and regular rhythm.      Pulses:           Radial pulses are 2+ on the right side and 2+ on the left side.      Heart sounds: Normal heart sounds. No murmur heard.    No friction rub. No gallop.   Pulmonary:      Effort: Pulmonary effort is normal.      Breath sounds: Normal breath sounds.   Abdominal:      General: Bowel sounds are normal.      Palpations: Abdomen is soft.   Musculoskeletal:         General: Normal range of motion.      Cervical back: Normal range of motion and neck supple.      Right lower leg: No tenderness. No edema.      Left lower leg: No tenderness. No edema.   Skin:     General: Skin is warm and dry.      Capillary Refill: Capillary refill takes less than 2 seconds.   Neurological:      General: No focal deficit present.      Mental Status: She is alert and oriented to person, place, and time.           EKG interpreted per ED physician, viewed by me.  Our findings are: Sinus rhythm rate 94.  ST depression noted.  No previous available.    Procedures           ED Course  ED Course as of 01/17/23 0423   e Jan 17, 2023   [address] [facility] plan of care, imaging with patient.   [id number]. [LB]      ED Course User Index  [LB] Aida Andujar, APRN      /68   Pulse 89   Temp 98.5 °F (36.9 °C) (Oral)   Resp 18   Ht 152 cm (59.84\")   Wt 52.7 kg (116 lb 2.9 oz)   SpO2 100%   BMI 22.81 kg/m²   Labs Reviewed   COMPREHENSIVE METABOLIC PANEL - Abnormal; Notable for the following components:       Result Value    Glucose 110 (*)     Creatinine 0.45 (*)     BUN/Creatinine Ratio 44.4 (*)     All other components within normal limits    Narrative:     GFR Normal >60  Chronic Kidney Disease <60  Kidney Failure <15     CBC WITH AUTO DIFFERENTIAL - Abnormal; Notable for the following components:    Neutrophil % 39.4 (*)     Lymphocyte % 45.8 (*)     All other components within normal limits   BNP (IN-HOUSE) - Normal    " Narrative:     Among patients with dyspnea, NT-proBNP is highly sensitive for the detection of acute congestive heart failure. In addition NT-proBNP of <300 pg/ml effectively rules out acute congestive heart failure with 99% negative predictive value.     TROPONIN (IN-HOUSE) - Normal    Narrative:     Troponin T Reference Range:  <= 0.03 ng/mL-   Negative for AMI  >0.03 ng/mL-     Abnormal for myocardial necrosis.  Clinicians would have to utilize clinical acumen, EKG, Troponin and serial changes to determine if it is an Acute Myocardial Infarction or myocardial injury due to an underlying chronic condition.       Results may be falsely decreased if patient taking Biotin.     TROPONIN (IN-HOUSE)   TSH   LIPID PANEL   BASIC METABOLIC PANEL   CBC WITH AUTO DIFFERENTIAL   CBC AND DIFFERENTIAL    Narrative:     The following orders were created for panel order CBC & Differential.  Procedure                               Abnormality         Status                     ---------                               -----------         ------                     CBC Auto Differential[[id number]]        Abnormal            Final result                 Please view results for these tests on the individual orders.     Medications   sodium chloride 0.9 % flush 10 mL (has no administration in time range)   sodium chloride 0.9 % flush 10 mL (has no administration in time range)   sodium chloride 0.9 % flush 10 mL (has no administration in time range)   sodium chloride 0.9 % infusion 40 mL (has no administration in time range)   acetaminophen (TYLENOL) tablet 650 mg (has no administration in time range)     Or   acetaminophen (TYLENOL) 160 MG/5ML solution 650 mg (has no administration in time range)     Or   acetaminophen (TYLENOL) suppository 650 mg (has no administration in time range)   aluminum-magnesium hydroxide-simethicone (MAALOX MAX) 400-400-40 MG/5ML suspension 15 mL (has no administration in time range)   ondansetron (ZOFRAN)  tablet 4 mg (has no administration in time range)     Or   ondansetron (ZOFRAN) injection 4 mg (has no administration in time range)   melatonin tablet 5 mg (has no administration in time range)   pantoprazole (PROTONIX) injection 40 mg (has no administration in time range)   nitroglycerin (NITROSTAT) SL tablet 0.4 mg (0.4 mg Sublingual Given 1/17/23 0200)   aspirin chewable tablet 324 mg (324 mg Oral Given 1/17/23 0146)   nitroglycerin (NITROSTAT) ointment 1 inch (1 inch Topical Given 1/17/23 0249)     XR Chest 1 View    Result Date: 1/17/2023  1. Patchy interstitial airspace disease in the right lower lobe could represent infectious bronchiolitis or viral pneumonia. Slot 63 Electronically signed by:  Gary Cao M.D.  1/17/2023 12:16 AM Mountain Time               HEART Score: 5                      Medical Decision Making  Chest pain, unspecified type: acute illness or injury  Amount and/or Complexity of Data Reviewed  Labs: ordered. Decision-making details documented in ED Course.  Radiology: ordered. Decision-making details documented in ED Course.  ECG/medicine tests: ordered. Decision-making details documented in ED Course.  Discussion of management or test interpretation with external provider(s): Hospitalist    Risk  OTC drugs.  Prescription drug management.  Decision regarding hospitalization.       was utilized throughout entire patient's visit.  Appropriate PPE worn during patient interactions.   Differential Dx: STEMI, NSTEMI, angina, PE  This is not an all inclusive list of diagnosis considered.   Patient was brought back to the emergency department room for evaluation and placed on appropriate monitoring.  Patient underwent the above exam and work-up.  Patient was given nitroglycerin for her chest pain.  It completely resolved.  She was placed on Nitropaste.  Hospitalist was consulted for admission.  At time of dispo patient is pain-free.  Labs: CBC, CMP reviewed.  For troponin negative.   BNP normal.  Radiology: X-ray imaging interpreted per radiologist, viewed by me reveals no acute findings.  EKG noted as above  Tests/studies considered but not performed: D-dimer, CT chest, patient's not having any shortness of breath at this time.  She denies any abnormal leg pain or swelling.  Patient refusal of studies/treatments:   Disposition : Admission or Discharge: I discussed with the patient their test results, work-up here in the emergency department, and need for admission and further evaluation. Patient is agreeable to the plan of care. At time of disposition patients VS are reviewed, and patient without acute distress.  Opportunity was provided for questions at the bedside, all questions and concerns were addressed.  Note Disclaimer: At [facility], we believe that sharing information builds trust and better relationships. You are receiving this note because you recently visited [facility]. It is possible you will see health information before a provider has talked with you about it. This kind of information can be easy to misunderstand. To help you fully understand what it means for your health, we urge you to discuss this note with your provider.  Note dictated utilizing Dragon Dictation.       Final diagnoses:   Chest pain, unspecified type       ED Disposition  ED Disposition     ED Disposition   Decision to Admit    Condition   --    Comment   Level of Care: Telemetry [5]   Diagnosis: Chest pain, unspecified type [[id number]]   Admitting Physician: ARETHA HAYES [[id number]]   Attending Physician: ARETHA HAYES [[id number]]               No follow-up provider specified.       Medication List      No changes were made to your prescriptions during this visit.          Aida Andujar, APRN  01/17/23 0423

## 2023-01-18 ENCOUNTER — TRANSITIONAL CARE MANAGEMENT TELEPHONE ENCOUNTER (OUTPATIENT)
Dept: CALL CENTER | Facility: HOSPITAL | Age: 58
End: 2023-01-18
Payer: MEDICAID

## 2023-01-18 LAB
BH CV ECHO MEAS - ACS: 1.94 CM
BH CV ECHO MEAS - AO MAX PG: 10.8 MMHG
BH CV ECHO MEAS - AO MEAN PG: 6.2 MMHG
BH CV ECHO MEAS - AO ROOT DIAM: 3 CM
BH CV ECHO MEAS - AO V2 MAX: 164.6 CM/SEC
BH CV ECHO MEAS - AO V2 VTI: 34.4 CM
BH CV ECHO MEAS - AVA(I,D): 1.97 CM2
BH CV ECHO MEAS - EDV(CUBED): 60.3 ML
BH CV ECHO MEAS - EDV(MOD-SP2): 51.6 ML
BH CV ECHO MEAS - EDV(MOD-SP4): 40.9 ML
BH CV ECHO MEAS - EF(MOD-SP2): 78 %
BH CV ECHO MEAS - EF(MOD-SP4): 83.9 %
BH CV ECHO MEAS - ESV(CUBED): 12.7 ML
BH CV ECHO MEAS - ESV(MOD-SP2): 11.3 ML
BH CV ECHO MEAS - ESV(MOD-SP4): 6.6 ML
BH CV ECHO MEAS - FS: 40.6 %
BH CV ECHO MEAS - IVS/LVPW: 1.02 CM
BH CV ECHO MEAS - IVSD: 0.66 CM
BH CV ECHO MEAS - LA DIMENSION: 3.3 CM
BH CV ECHO MEAS - LV DIASTOLIC VOL/BSA (35-75): 28.4 CM2
BH CV ECHO MEAS - LV MASS(C)D: 69.1 GRAMS
BH CV ECHO MEAS - LV MAX PG: 10.1 MMHG
BH CV ECHO MEAS - LV MEAN PG: 5 MMHG
BH CV ECHO MEAS - LV SYSTOLIC VOL/BSA (12-30): 4.6 CM2
BH CV ECHO MEAS - LV V1 MAX: 159 CM/SEC
BH CV ECHO MEAS - LV V1 VTI: 26.9 CM
BH CV ECHO MEAS - LVIDD: 3.9 CM
BH CV ECHO MEAS - LVIDS: 2.33 CM
BH CV ECHO MEAS - LVOT AREA: 2.5 CM2
BH CV ECHO MEAS - LVOT DIAM: 1.79 CM
BH CV ECHO MEAS - LVPWD: 0.64 CM
BH CV ECHO MEAS - MV A MAX VEL: 87.4 CM/SEC
BH CV ECHO MEAS - MV DEC SLOPE: 496.6 CM/SEC2
BH CV ECHO MEAS - MV DEC TIME: 0.14 MSEC
BH CV ECHO MEAS - MV E MAX VEL: 71.8 CM/SEC
BH CV ECHO MEAS - MV E/A: 0.82
BH CV ECHO MEAS - MV MAX PG: 4.2 MMHG
BH CV ECHO MEAS - MV MEAN PG: 2.7 MMHG
BH CV ECHO MEAS - MV V2 VTI: 17.6 CM
BH CV ECHO MEAS - MVA(VTI): 3.8 CM2
BH CV ECHO MEAS - PA ACC TIME: 0.13 SEC
BH CV ECHO MEAS - PA PR(ACCEL): 21.9 MMHG
BH CV ECHO MEAS - PA V2 MAX: 110.3 CM/SEC
BH CV ECHO MEAS - RAP SYSTOLE: 3 MMHG
BH CV ECHO MEAS - RV MAX PG: 5.4 MMHG
BH CV ECHO MEAS - RV V1 MAX: 116.3 CM/SEC
BH CV ECHO MEAS - RV V1 VTI: 21.3 CM
BH CV ECHO MEAS - RVDD: 2.6 CM
BH CV ECHO MEAS - RVSP: 22.4 MMHG
BH CV ECHO MEAS - SI(MOD-SP2): 27.9 ML/M2
BH CV ECHO MEAS - SI(MOD-SP4): 23.8 ML/M2
BH CV ECHO MEAS - SV(LVOT): 67.5 ML
BH CV ECHO MEAS - SV(MOD-SP2): 40.3 ML
BH CV ECHO MEAS - SV(MOD-SP4): 34.3 ML
BH CV ECHO MEAS - TR MAX PG: 19.4 MMHG
BH CV ECHO MEAS - TR MAX VEL: 218.7 CM/SEC
MAXIMAL PREDICTED HEART RATE: 162 BPM
STRESS TARGET HR: 138 BPM

## 2023-01-18 PROCEDURE — 93306 TTE W/DOPPLER COMPLETE: CPT | Performed by: INTERNAL MEDICINE

## 2023-01-18 NOTE — OUTREACH NOTE
Call Center TCM Note    Flowsheet Row Responses   [facility] [place] patient discharged from? [facility]   Does the patient have one of the following disease processes/diagnoses(primary or secondary)? Other   TCM attempt successful? Yes   Call start time 1042   Call end time 1052   Discharge diagnosis Chest pain   If primary language is not English what is the name and relationship or agency of  used? Iranian. [employer] ID # [id number]   Person spoke with today (if not patient) and relationship patient   Meds reviewed with patient/caregiver? Yes   Does the patient have all medications ordered at discharge? Yes   Is the patient taking all medications as directed (includes completed medication regime)? Yes   Comments Patient has New patient appt with Valerie DE LA ROSA 2/9/23  2pm.    Does the patient have an appointment with their PCP within 7 days of discharge? No  [Current appt >14days. ]   Nursing Interventions Confirmed date/time of appointment   Has home health visited the patient within 72 hours of discharge? N/A   Psychosocial issues? No   Did the patient receive a copy of their discharge instructions? Yes   Nursing interventions Reviewed instructions with patient   What is the patient's perception of their health status since discharge? Improving  [Denies any further chest pain. ]   Is the patient/caregiver able to teach back the hierarchy of who to call/visit for symptoms/problems? PCP, Specialist, Home health nurse, Urgent Care, ED, 911 Yes   If the patient is a current smoker, are they able to teach back resources for cessation? Not a smoker   TCM call completed? Yes   Wrap up additional comments Patient verbalizes understanding of medication instructions and the f/u appt scheduled.    Call end time 1052   Would this patient benefit from a Referral to Amb Social Work? No   Is the patient interested in additional calls from an ambulatory ?  NOTE:  applies to high risk patients requiring  additional follow-up. No          Bing Roblero RN    1/18/2023, 10:53 EST

## 2023-01-18 NOTE — CASE MANAGEMENT/SOCIAL WORK
Case Management Discharge Note      Final Note: Home    Provided Post Acute Provider List?: N/A  N/A Provider List Comment: Anticipate home    Selected Continued Care - Discharged on 1/17/2023 Admission date: 1/17/2023 - Discharge disposition: Home or Self Care            Transportation Services  Private: Car    Final Discharge Disposition Code: 01 - home or self-care

## 2023-01-18 NOTE — OUTREACH NOTE
Prep Survey    Flowsheet Row Responses   [facility]ucian[facility] facility patient discharged from? [facility]   Is LACE score < 7 ? Yes   Eligibility [facility]   H[facility]   Date of Admission 01/17/23   Date of Discharge 01/17/23   Discharge Disposition Home or Self Care   Discharge diagnosis Chest pain   Does the patient have one of the following disease processes/diagnoses(primary or secondary)? Other   Does the patient have Home health ordered? No   Is there a DME ordered? No   Comments regarding appointments New PCP appt   Prep survey completed? Yes          RICK DUMONT - Registered Nurse

## 2023-01-24 LAB — QT INTERVAL: 353 MS

## 2023-02-09 ENCOUNTER — OFFICE VISIT (OUTPATIENT)
Dept: FAMILY MEDICINE CLINIC | Facility: CLINIC | Age: 58
End: 2023-02-09
Payer: MEDICAID

## 2023-02-09 VITALS
WEIGHT: 111.4 LBS | HEIGHT: 59 IN | HEART RATE: 83 BPM | DIASTOLIC BLOOD PRESSURE: 78 MMHG | BODY MASS INDEX: 22.46 KG/M2 | SYSTOLIC BLOOD PRESSURE: 120 MMHG | OXYGEN SATURATION: 99 %

## 2023-02-09 DIAGNOSIS — R07.89 SENSATION OF CHEST TIGHTNESS: ICD-10-CM

## 2023-02-09 DIAGNOSIS — K21.9 GASTROESOPHAGEAL REFLUX DISEASE WITHOUT ESOPHAGITIS: ICD-10-CM

## 2023-02-09 DIAGNOSIS — Z00.00 PREVENTATIVE HEALTH CARE: Primary | ICD-10-CM

## 2023-02-09 DIAGNOSIS — I10 PRIMARY HYPERTENSION: ICD-10-CM

## 2023-02-09 PROCEDURE — 99204 OFFICE O/P NEW MOD 45 MIN: CPT | Performed by: NURSE PRACTITIONER

## 2023-02-09 RX ORDER — PANTOPRAZOLE SODIUM 40 MG/1
40 TABLET, DELAYED RELEASE ORAL 2 TIMES DAILY
Qty: 60 TABLET | Refills: 5 | Status: SHIPPED | OUTPATIENT
Start: 2023-02-09

## 2023-02-09 RX ORDER — ASPIRIN 81 MG/1
81 TABLET, CHEWABLE ORAL DAILY
Qty: 30 TABLET | Refills: 5 | Status: SHIPPED | OUTPATIENT
Start: 2023-02-09

## 2023-02-09 RX ORDER — METOPROLOL SUCCINATE 25 MG/1
25 TABLET, EXTENDED RELEASE ORAL
Qty: 30 TABLET | Refills: 5 | Status: SHIPPED | OUTPATIENT
Start: 2023-02-09

## 2023-02-09 NOTE — PROGRESS NOTES
Chief Complaint  Chief Complaint   Patient presents with   • Follow-up     Pt was seen in er for acid reflux, chest pain and HTN. Pt does not have severe chest pain today but sometimes has chest tightness. Pt says tightness does not depend on activity            Subjective          Bree Mcmanus presents to [facility] for   History of Present Illness     (A Tamazight-speaking  was used for the entirety of this visit)    New 58-year-old female Tamazight-speaking patient presents to establish care with new provider and for hospital follow-up. She presented to [facility] ED on 1/17/2023 with complaints of chest pain, shortness of air, nausea.  Initial troponin negative, chest x-ray showed patchy interstitial airspace disease in the right lower lobe.  She was given 325 mg chewable aspirin and nitroglycerin x3 along with Nitropaste.  Chest pain improved to a level 1 out of 10, she was placed in observation for 16 hours to rule out ACS and d/c'd home on 1/17/2023.     EKG remained negative  Troponins remained normal  Stress test showed no ischemia or myocardial infarction  Echocardiogram normal LV function and size, mild LVH, EF 60 to 65%.    She was started on Protonix, Toprol-XL, asa 81mg.  She was recommended to follow-up with Dr. Quiñones in the office in 1 month, if chest pain recurs he recommends cardiac catheterization, she does not have f/u appt scheduled.     The patient reports she is taking Toprol, pantoprazole and aspirin as directed.  She reports having occasional chest discomfort/tightness, but denies chest pain, shortness of air, palpitations or swelling of the extremities. She does report significant improvement in all symptoms now on pantoprazole but feels acid coming up esophagus worse at night and in am.  She denies constipation, diarrhea, abdominal pain      The following portions of the patient's history were reviewed and updated as appropriate: allergies,  "current medications, past family history, past medical history, past social history, past surgical history and problem list.    Past Medical History:   Diagnosis Date   • Asthma    • Benign breast lumps    • Hypertension      Past Surgical History:   Procedure Laterality Date   • EYE SURGERY Bilateral      Family History   Problem Relation Age of Onset   • Diabetes Father    • Heart disease Sister    • Cancer Brother    • Diabetes Paternal Uncle      Social History     Tobacco Use   • Smoking status: Never     Passive exposure: Never   • Smokeless tobacco: Never   Substance Use Topics   • Alcohol use: Never       Current Outpatient Medications:   •  albuterol sulfate  (90 Base) MCG/ACT inhaler, Inhale 2 puffs Every 4 (Four) Hours As Needed for Wheezing., Disp: , Rfl:   •  aspirin 81 MG chewable tablet, Chew 1 tablet Daily., Disp: 30 tablet, Rfl: 5  •  metoprolol succinate XL (TOPROL-XL) 25 MG 24 hr tablet, Take 1 tablet by mouth Daily. HTN, Disp: 30 tablet, Rfl: 5  •  pantoprazole (Protonix) 40 MG EC tablet, Take 1 tablet by mouth 2 (Two) Times a Day. For reflux, Disp: 60 tablet, Rfl: 5    Objective   Vital Signs:   /78 (BP Location: Right arm, Patient Position: Sitting, Cuff Size: Adult)   Pulse 83   Ht 149.9 cm (59.02\")   Wt 50.5 kg (111 lb 6.4 oz)   SpO2 99%   BMI 22.49 kg/m²           Physical Exam  Constitutional:       General: She is not in acute distress.     Appearance: Normal appearance. She is well-developed. She is not ill-appearing or diaphoretic.      Comments: Greek-speaking, her daughter is present for exam as well   HENT:      Head: Normocephalic.   Eyes:      Conjunctiva/sclera: Conjunctivae normal.      Pupils: Pupils are equal, round, and reactive to light.   Neck:      Thyroid: No thyromegaly.      Vascular: No JVD.   Cardiovascular:      Rate and Rhythm: Normal rate and regular rhythm.      Heart sounds: Normal heart sounds. No murmur heard.  Pulmonary:      Effort: " Pulmonary effort is normal. No respiratory distress.      Breath sounds: Normal breath sounds. No wheezing or rhonchi.   Abdominal:      General: Bowel sounds are normal. There is no distension.      Palpations: Abdomen is soft.      Tenderness: There is no abdominal tenderness.   Musculoskeletal:         General: No swelling or tenderness. Normal range of motion.      Cervical back: Normal range of motion and neck supple.   Skin:     General: Skin is warm and dry.      Findings: No erythema or rash.   Neurological:      Mental Status: She is alert and oriented to person, place, and time.      Sensory: No sensory deficit.   Psychiatric:         Behavior: Behavior normal.         Judgment: Judgment normal.          Result Review :     No visits with results within 7 Day(s) from this visit.   Latest known visit with results is:   Admission on 01/17/2023, Discharged on 01/17/2023   Component Date Value Ref Range Status   • QT Interval 01/17/2023 353  ms Final   • Glucose 01/17/2023 110 (H)  65 - 99 mg/dL Final   • BUN 01/17/2023 20  6 - 20 mg/dL Final   • Creatinine 01/17/2023 0.45 (L)  0.57 - 1.00 mg/dL Final   • Sodium 01/17/2023 140  136 - 145 mmol/L Final   • Potassium 01/17/2023 3.9  3.5 - 5.2 mmol/L Final   • Chloride 01/17/2023 104  98 - 107 mmol/L Final   • CO2 01/17/2023 27.0  22.0 - 29.0 mmol/L Final   • Calcium 01/17/2023 9.3  8.6 - 10.5 mg/dL Final   • Total Protein 01/17/2023 6.6  6.0 - 8.5 g/dL Final   • Albumin 01/17/2023 4.3  3.5 - 5.2 g/dL Final   • ALT (SGPT) 01/17/2023 11  1 - 33 U/L Final   • AST (SGOT) 01/17/2023 20  1 - 32 U/L Final   • Alkaline Phosphatase 01/17/2023 96  39 - 117 U/L Final   • Total Bilirubin 01/17/2023 <0.2  0.0 - 1.2 mg/dL Final   • Globulin 01/17/2023 2.3  gm/dL Final   • A/G Ratio 01/17/2023 1.9  g/dL Final   • BUN/Creatinine Ratio 01/17/2023 44.4 (H)  7.0 - 25.0 Final   • Anion Gap 01/17/2023 9.0  5.0 - 15.0 mmol/L Final   • eGFR 01/17/2023 111.7  >60.0 mL/min/1.73 Final     National Kidney Foundation and American Society of Nephrology (ASN) Task Force recommended calculation based on the Chronic Kidney Disease Epidemiology Collaboration (CKD-EPI) equation refit without adjustment for race.   • proBNP 01/17/2023 206.4  0.0 - 900.0 pg/mL Final   • Troponin T 01/17/2023 <0.010  0.000 - 0.030 ng/mL Final   • WBC 01/17/2023 5.50  3.40 - 10.80 10*3/mm3 Final   • RBC 01/17/2023 4.22  3.77 - 5.28 10*6/mm3 Final   • Hemoglobin 01/17/2023 13.3  12.0 - 15.9 g/dL Final   • Hematocrit 01/17/2023 38.9  34.0 - 46.6 % Final   • MCV 01/17/2023 92.1  79.0 - 97.0 fL Final   • MCH 01/17/2023 31.6  26.6 - 33.0 pg Final   • MCHC 01/17/2023 34.4  31.5 - 35.7 g/dL Final   • RDW 01/17/2023 12.6  12.3 - 15.4 % Final   • RDW-SD 01/17/2023 42.4  37.0 - 54.0 fl Final   • MPV 01/17/2023 7.5  6.0 - 12.0 fL Final   • Platelets 01/17/2023 292  140 - 450 10*3/mm3 Final   • Neutrophil % 01/17/2023 39.4 (L)  42.7 - 76.0 % Final   • Lymphocyte % 01/17/2023 45.8 (H)  19.6 - 45.3 % Final   • Monocyte % 01/17/2023 9.9  5.0 - 12.0 % Final   • Eosinophil % 01/17/2023 4.3  0.3 - 6.2 % Final   • Basophil % 01/17/2023 0.6  0.0 - 1.5 % Final   • Neutrophils, Absolute 01/17/2023 2.20  1.70 - 7.00 10*3/mm3 Final   • Lymphocytes, Absolute 01/17/2023 2.50  0.70 - 3.10 10*3/mm3 Final   • Monocytes, Absolute 01/17/2023 0.50  0.10 - 0.90 10*3/mm3 Final   • Eosinophils, Absolute 01/17/2023 0.20  0.00 - 0.40 10*3/mm3 Final   • Basophils, Absolute 01/17/2023 0.00  0.00 - 0.20 10*3/mm3 Final   • nRBC 01/17/2023 0.1  0.0 - 0.2 /100 WBC Final   • Troponin T 01/17/2023 <0.010  0.000 - 0.030 ng/mL Final   • TSH 01/17/2023 1.300  0.270 - 4.200 uIU/mL Final   • Total Cholesterol 01/17/2023 182  0 - 200 mg/dL Final   • Triglycerides 01/17/2023 81  0 - 150 mg/dL Final   • HDL Cholesterol 01/17/2023 67 (H)  40 - 60 mg/dL Final   • LDL Cholesterol  01/17/2023 100  0 - 100 mg/dL Final   • VLDL Cholesterol 01/17/2023 15  5 - 40 mg/dL Final   • LDL/HDL  Ratio 01/17/2023 1.47   Final   • QT Interval 01/17/2023 377  ms Preliminary   • Glucose 01/17/2023 104 (H)  65 - 99 mg/dL Final   • BUN 01/17/2023 19  6 - 20 mg/dL Final   • Creatinine 01/17/2023 0.43 (L)  0.57 - 1.00 mg/dL Final   • Sodium 01/17/2023 142  136 - 145 mmol/L Final   • Potassium 01/17/2023 3.9  3.5 - 5.2 mmol/L Final   • Chloride 01/17/2023 107  98 - 107 mmol/L Final   • CO2 01/17/2023 27.0  22.0 - 29.0 mmol/L Final   • Calcium 01/17/2023 8.6  8.6 - 10.5 mg/dL Final   • BUN/Creatinine Ratio 01/17/2023 44.2 (H)  7.0 - 25.0 Final   • Anion Gap 01/17/2023 8.0  5.0 - 15.0 mmol/L Final   • eGFR 01/17/2023 112.9  >60.0 mL/min/1.73 Final    National Kidney Foundation and American Society of Nephrology (ASN) Task Force recommended calculation based on the Chronic Kidney Disease Epidemiology Collaboration (CKD-EPI) equation refit without adjustment for race.   • WBC 01/17/2023 10.20  3.40 - 10.80 10*3/mm3 Final   • RBC 01/17/2023 3.78  3.77 - 5.28 10*6/mm3 Final   • Hemoglobin 01/17/2023 11.8 (L)  12.0 - 15.9 g/dL Final   • Hematocrit 01/17/2023 35.0  34.0 - 46.6 % Final   • MCV 01/17/2023 92.6  79.0 - 97.0 fL Final   • MCH 01/17/2023 31.1  26.6 - 33.0 pg Final   • MCHC 01/17/2023 33.6  31.5 - 35.7 g/dL Final   • RDW 01/17/2023 12.6  12.3 - 15.4 % Final   • RDW-SD 01/17/2023 43.3  37.0 - 54.0 fl Final   • MPV 01/17/2023 7.6  6.0 - 12.0 fL Final   • Platelets 01/17/2023 273  140 - 450 10*3/mm3 Final   • Neutrophil % 01/17/2023 75.1  42.7 - 76.0 % Final   • Lymphocyte % 01/17/2023 17.8 (L)  19.6 - 45.3 % Final   • Monocyte % 01/17/2023 5.3  5.0 - 12.0 % Final   • Eosinophil % 01/17/2023 1.4  0.3 - 6.2 % Final   • Basophil % 01/17/2023 0.4  0.0 - 1.5 % Final   • Neutrophils, Absolute 01/17/2023 7.60 (H)  1.70 - 7.00 10*3/mm3 Final   • Lymphocytes, Absolute 01/17/2023 1.80  0.70 - 3.10 10*3/mm3 Final   • Monocytes, Absolute 01/17/2023 0.50  0.10 - 0.90 10*3/mm3 Final   • Eosinophils, Absolute 01/17/2023 0.10  0.00  - 0.40 10*3/mm3 Final   • Basophils, Absolute 01/17/2023 0.00  0.00 - 0.20 10*3/mm3 Final   • nRBC 01/17/2023 0.0  0.0 - 0.2 /100 WBC Final   • Troponin T 01/17/2023 <0.010  0.000 - 0.030 ng/mL Final   • Magnesium 01/17/2023 2.0  1.6 - 2.6 mg/dL Final   • D-Dimer, Quantitative 01/17/2023 <0.19  0.00 - 0.58 mg/L (FEU) Final   • Target HR (85%) 01/17/2023 138  bpm Final   • Max. Pred. HR (100%) 01/17/2023 162  bpm Final   • BH CV STRESS PROTOCOL 1 01/17/2023 Pharmacologic   Final   • Stage 1 01/17/2023 1   Final   • HR Stage 1 01/17/2023 120   Final   • BP Stage 1 01/17/2023 106/66   Final   • Duration Min Stage 1 01/17/2023 1   Final   • Stress Dose Regadenoson Stage 1 01/17/2023 0.4   Final   • Stress Comments Stage 1 01/17/2023 10 sec bolus injection   Final   • Stage 2 01/17/2023 2   Final   • HR Stage 2 01/17/2023 117   Final   • BP Stage 2 01/17/2023 106/66   Final   • Duration Min Stage 2 01/17/2023 1   Final   • Duration Sec Stage 2 01/17/2023 0   Final   • Stage 3 01/17/2023 3   Final   • HR Stage 3 01/17/2023 113   Final   • BP Stage 3 01/17/2023 110/64   Final   • Duration Min Stage 3 01/17/2023 1   Final   • Stage 4 01/17/2023 4   Final   • HR Stage 4 01/17/2023 111   Final   • BP Stage 4 01/17/2023 110/64   Final   • Duration Min Stage 4 01/17/2023 1   Final   • Baseline HR 01/17/2023 85  bpm Final   • Baseline BP 01/17/2023 120/65  mmHg Final   • Peak HR 01/17/2023 121  bpm Final   • Percent Max Pred HR 01/17/2023 74.69  % Final   • Percent Target HR 01/17/2023 88  % Final   • Peak BP 01/17/2023 106/66  mmHg Final   • Recovery HR 01/17/2023 104  bpm Final   • Recovery BP 01/17/2023 119/74  mmHg Final   • Nuclear Prior Study 01/17/2023 3.0   Final   • BH CV REST NUCLEAR ISOTOPE DOSE 01/17/2023 8.0  mCi Final   • BH CV STRESS NUCLEAR ISOTOPE DOSE 01/17/2023 31.4  mCi Final   • Nuc Stress EF 01/17/2023 88  % Final   • Target HR (85%) 01/17/2023 138  bpm Final   • Max. Pred. HR (100%) 01/17/2023 162  bpm  Final   • LVIDd 01/17/2023 3.9  cm Final   • LVIDs 01/17/2023 2.33  cm Final   • IVSd 01/17/2023 0.66  cm Final   • LVPWd 01/17/2023 0.64  cm Final   • FS 01/17/2023 40.6  % Final   • IVS/LVPW 01/17/2023 1.02  cm Final   • ESV(cubed) 01/17/2023 12.7  ml Final   • LV Sys Vol (BSA corrected) 01/17/2023 4.6  cm2 Final   • EDV(cubed) 01/17/2023 60.3  ml Final   • LV Collazo Vol (BSA corrected) 01/17/2023 28.4  cm2 Final   • LVOT area 01/17/2023 2.5  cm2 Final   • LV mass(C)d 01/17/2023 69.1  grams Final   • LVOT diam 01/17/2023 1.79  cm Final   • EDV(MOD-sp2) 01/17/2023 51.6  ml Final   • EDV(MOD-sp4) 01/17/2023 40.9  ml Final   • ESV(MOD-sp2) 01/17/2023 11.3  ml Final   • ESV(MOD-sp4) 01/17/2023 6.6  ml Final   • SV(MOD-sp2) 01/17/2023 40.3  ml Final   • SV(MOD-sp4) 01/17/2023 34.3  ml Final   • SI(MOD-sp2) 01/17/2023 27.9  ml/m2 Final   • SI(MOD-sp4) 01/17/2023 23.8  ml/m2 Final   • EF(MOD-sp2) 01/17/2023 78.0  % Final   • EF(MOD-sp4) 01/17/2023 83.9  % Final   • MV E max breanne 01/17/2023 71.8  cm/sec Final   • MV A max breanne 01/17/2023 87.4  cm/sec Final   • MV dec time 01/17/2023 0.14  msec Final   • MV E/A 01/17/2023 0.82   Final   • SV(LVOT) 01/17/2023 67.5  ml Final   • RVIDd 01/17/2023 2.6  cm Final   • LA dimension (2D)  01/17/2023 3.3  cm Final   • LV V1 max 01/17/2023 159.0  cm/sec Final   • LV V1 max PG 01/17/2023 10.1  mmHg Final   • LV V1 mean PG 01/17/2023 5.0  mmHg Final   • LV V1 VTI 01/17/2023 26.9  cm Final   • Ao pk breanne 01/17/2023 164.6  cm/sec Final   • Ao max PG 01/17/2023 10.8  mmHg Final   • Ao mean PG 01/17/2023 6.2  mmHg Final   • Ao V2 VTI 01/17/2023 34.4  cm Final   • NAM(I,D) 01/17/2023 1.97  cm2 Final   • MV max PG 01/17/2023 4.2  mmHg Final   • MV mean PG 01/17/2023 2.7  mmHg Final   • MV V2 VTI 01/17/2023 17.6  cm Final   • MVA(VTI) 01/17/2023 3.8  cm2 Final   • MV dec slope 01/17/2023 496.6  cm/sec2 Final   • TR max breanne 01/17/2023 218.7  cm/sec Final   • TR max PG 01/17/2023 19.4  mmHg Final    • RVSP(TR) 01/17/2023 22.4  mmHg Final   • RAP systole 01/17/2023 3.0  mmHg Final   • RV V1 max PG 01/17/2023 5.4  mmHg Final   • RV V1 max 01/17/2023 116.3  cm/sec Final   • RV V1 VTI 01/17/2023 21.3  cm Final   • PA V2 max 01/17/2023 110.3  cm/sec Final   • PA acc time 01/17/2023 0.13  sec Final   • PA pr(Accel) 01/17/2023 21.9  mmHg Final   • Ao root diam 01/17/2023 3.0  cm Final   • ACS 01/17/2023 1.94  cm Final                  BMI is within normal parameters. No other follow-up for BMI required.           Assessment and Plan    Diagnoses and all orders for this visit:    1. Preventative health care (Primary)    2. Gastroesophageal reflux disease without esophagitis    3. Sensation of chest tightness    4. Primary hypertension    Other orders  -     pantoprazole (Protonix) 40 MG EC tablet; Take 1 tablet by mouth 2 (Two) Times a Day. For reflux  Dispense: 60 tablet; Refill: 5  -     metoprolol succinate XL (TOPROL-XL) 25 MG 24 hr tablet; Take 1 tablet by mouth Daily. HTN  Dispense: 30 tablet; Refill: 5  -     aspirin 81 MG chewable tablet; Chew 1 tablet Daily.  Dispense: 30 tablet; Refill: 5      ED/hospitalization reports, labs, echo and stress test reviewed  Recommend increase pantoprazole to twice daily  Continue Toprol and aspirin once daily  Appointment was made for Dr. Quiñones by my staff, scheduled for March 1  AVS printed in Estonian for patient to review  Will plan to see patient back in 3 months for recheck and to discuss preventative/maintenance health care  Questions were answered, she understands to continue medications with increase of pantoprazole        I spent 45 minutes caring for Bree Mcmanus on this date of service. This time includes time spent by me in the following activities: preparing for the visit, reviewing tests, performing a medically appropriate examination and/or evaluation , counseling and educating the patient/family/caregiver, ordering medications, tests, or procedures and  documenting information in the medical record        Follow Up     Return in about 3 months (around 5/9/2023) for Recheck.  Patient was given instructions and counseling regarding her condition or for health maintenance advice. Please see specific information pulled into the AVS if appropriate.        Part of this note may be an electronic transcription/translation of spoken language to printed text using the Dragon Dictation System